# Patient Record
Sex: FEMALE | Race: ASIAN
[De-identification: names, ages, dates, MRNs, and addresses within clinical notes are randomized per-mention and may not be internally consistent; named-entity substitution may affect disease eponyms.]

---

## 2019-02-20 ENCOUNTER — HOSPITAL ENCOUNTER (EMERGENCY)
Dept: HOSPITAL 72 - EMR | Age: 24
Discharge: HOME | End: 2019-02-20
Payer: MEDICAID

## 2019-02-20 VITALS — HEIGHT: 61 IN | BODY MASS INDEX: 19.83 KG/M2 | WEIGHT: 105 LBS

## 2019-02-20 VITALS — DIASTOLIC BLOOD PRESSURE: 61 MMHG | SYSTOLIC BLOOD PRESSURE: 104 MMHG

## 2019-02-20 DIAGNOSIS — B85.0: Primary | ICD-10-CM

## 2019-02-20 PROCEDURE — 99282 EMERGENCY DEPT VISIT SF MDM: CPT

## 2019-02-20 NOTE — EMERGENCY ROOM REPORT
History of Present Illness


General


Chief Complaint:  To Be Triaged





Present Illness


HPI


24-year-old female presents to the emergency department complaining of itching 

and infestation of the scalp/hair times one month.  Patient reports increase in 

small white specks that are difficult to remove.  Patient denies history of 

dandruff.  Patient denies fevers, chills, rashes, swollen tender lymph nodes, 

erythema or warmth.  Patient states she is up-to-date with vaccinations she 

denies ill contacts or recent travel.  Denies lesions/rashes elsewhere on the 

body. Denies new medications or body washes or creams. Denies swelling of the 

lips, tongue , throat or airway. Denies wheezing, or shortness of breath.  

Denies blisters, oral lesions, or sloughing of the skin.


Allergies:  


Coded Allergies:  


     No Known Allergies (Unverified , 2/20/19)





Patient History


Past Medical History:  see triage record


Past Surgical History:  none


Pertinent Family History:  none


Pregnant Now:  No


Immunizations:  UTD


Reviewed Nursing Documentation:  PMH: Agreed; PSxH: Agreed





Review of Systems


All Other Systems:  negative except mentioned in HPI





Physical Exam


Sp02 EP Interpretation:  reviewed, normal


General Appearance:  no apparent distress, alert, GCS 15, non-toxic


Head:  normocephalic, atraumatic


Eyes:  bilateral eye normal inspection, bilateral eye PERRL


ENT:  hearing grossly normal, normal pharynx, no angioedema, normal voice


Neck:  full range of motion


Respiratory:  lungs clear, normal breath sounds, no wheezing, speaking full 

sentences


Cardiovascular #1:  regular rate, rhythm


Musculoskeletal:  back normal, gait/station normal, normal range of motion, non-

tender


Neurologic:  alert, oriented x3, responsive, motor strength/tone normal, 

sensory intact, speech normal, grossly normal


Psychiatric:  judgement/insight normal


Skin:  normal color, no rash, warm/dry, well hydrated, other - numerous small 

white nits noted throughout hair. no obvious areas of acute infection, no 

warmth or erythema, no alopecia, no blisters or vessicles


Lymphatic:  no adenopathy





Medical Decision Making


PA Attestation


Dr. Zhong  is my supervising Physician whom patient management has been 

discussed with.


Diagnostic Impression:  


 Primary Impression:  


 Lice


ER Course


24-year-old female presents to the emergency department complaining of itching 

and infestation of the scalp/hair times one month.  Patient reports increase in 

small white specks that are difficult to remove.  Patient denies history of 

dandruff.  Patient denies fevers, chills, rashes, swollen tender lymph nodes, 

erythema or warmth.  Patient states she is up-to-date with vaccinations she 

denies ill contacts or recent travel.  Denies lesions/rashes elsewhere on the 

body. Denies new medications or body washes or creams. Denies swelling of the 

lips, tongue , throat or airway. Denies wheezing, or shortness of breath.  

Denies blisters, oral lesions, or sloughing of the skin.





Ddx considered but are not limited to cellulitis, scabies, Lice, shingles, 

varicella, dermatitis, urticaria, eczema, tinea, viral exanthem, SJS





Vital signs: are WNL, pt. is afebrile





H&PE are most consistent with Head Lice infestation





ORDERS: none required at this time, the diagnosis is clinical





ED INTERVENTIONS: None required at this time. 





d/w pt. conservative treatment, and to follow up with a primary care provider. 

pt given a list of primary care clinics for follow up. d/w pt. to return to the 

ED with worsening or new symptoms.





DISCHARGE: At this time pt. is stable for d/c to home. Will provide printed 

patient care instructions, and any necessary prescriptions. Care plan and 

follow up instructions have been discussed with the patient prior to discharge.


Disposition:  HOME, SELF-CARE


Condition:  Stable


Scripts


Permethrin (LICE KILLING) 240 Ml Liquid


240 ML TP DAILY PRN for Per rx protocol, #240 ML


   Prov: Ambar Osborne         2/20/19


Patient Instructions:  Lice, Adult





Additional Instructions:  


Take medications as directed. 





 ** Follow up with a Primary Care Provider in 3-5 days, even if your symptoms 

have resolved. ** 


--Please review list of primary care clinics, if you do not already have a 

primary care provider





Return sooner to ED if new symptoms occur, or current symptoms become worse. 








- Please note that this Emergency Department Report was dictated using [employer] technology software, occasionally this can lead to 

erroneous entry secondary to interpretation by the dictation equipment.











Ambar Osborne Feb 20, 2019 18:09